# Patient Record
Sex: MALE | Race: WHITE | NOT HISPANIC OR LATINO | Employment: FULL TIME | URBAN - METROPOLITAN AREA
[De-identification: names, ages, dates, MRNs, and addresses within clinical notes are randomized per-mention and may not be internally consistent; named-entity substitution may affect disease eponyms.]

---

## 2018-03-22 ENCOUNTER — OFFICE VISIT CONVERTED (OUTPATIENT)
Dept: GASTROENTEROLOGY | Facility: CLINIC | Age: 48
End: 2018-03-22
Attending: PHYSICIAN ASSISTANT

## 2021-05-16 VITALS
DIASTOLIC BLOOD PRESSURE: 86 MMHG | SYSTOLIC BLOOD PRESSURE: 132 MMHG | BODY MASS INDEX: 32.15 KG/M2 | HEIGHT: 72 IN | HEART RATE: 86 BPM | RESPIRATION RATE: 12 BRPM | WEIGHT: 237.37 LBS

## 2023-03-30 ENCOUNTER — CLINICAL SUPPORT (OUTPATIENT)
Dept: GASTROENTEROLOGY | Facility: CLINIC | Age: 53
End: 2023-03-30
Payer: COMMERCIAL

## 2023-03-30 ENCOUNTER — PREP FOR SURGERY (OUTPATIENT)
Dept: OTHER | Facility: HOSPITAL | Age: 53
End: 2023-03-30
Payer: COMMERCIAL

## 2023-03-30 DIAGNOSIS — Z80.0 FAMILY HISTORY OF COLON CANCER: Primary | ICD-10-CM

## 2023-03-30 DIAGNOSIS — Z86.010 HISTORY OF COLON POLYPS: ICD-10-CM

## 2023-03-30 RX ORDER — PANTOPRAZOLE SODIUM 40 MG/1
1 TABLET, DELAYED RELEASE ORAL DAILY
COMMUNITY
Start: 2022-12-31

## 2023-03-30 RX ORDER — SODIUM, POTASSIUM,MAG SULFATES 17.5-3.13G
1 SOLUTION, RECONSTITUTED, ORAL ORAL EVERY 12 HOURS
Qty: 354 ML | Refills: 0 | Status: SHIPPED | OUTPATIENT
Start: 2023-03-30

## 2023-03-30 RX ORDER — HYDROCHLOROTHIAZIDE 25 MG/1
1 TABLET ORAL DAILY
COMMUNITY
Start: 2023-03-25

## 2023-03-30 NOTE — PROGRESS NOTES
Rakesh Juan  1970  53 y.o.    Reason for call: Recall  Prep prescribed: Suprep  Prep instructions reviewed with patient and sent to patient via regular mail to the home address on file  Clearance needed? No  If yes, what clearance is needed? N/A  Clearance has been requested from N/A   The patient has been scheduled for: Colonoscopy  Family history of colon cancer? Yes  If yes, indicate relative: Paternal Uncle  Family History   Problem Relation Age of Onset   • Colon cancer Paternal Uncle      Past Medical History:   Diagnosis Date   • Colon polyp      No Known Allergies  Past Surgical History:   Procedure Laterality Date   • COLONOSCOPY       Social History     Socioeconomic History   • Marital status:    Tobacco Use   • Smoking status: Never     Passive exposure: Past   • Smokeless tobacco: Never   Substance and Sexual Activity   • Alcohol use: Not Currently     Comment: occasional   • Drug use: Defer   • Sexual activity: Defer       Current Outpatient Medications:   •  hydroCHLOROthiazide (HYDRODIURIL) 25 MG tablet, Take 1 tablet by mouth Daily., Disp: , Rfl:   •  pantoprazole (PROTONIX) 40 MG EC tablet, Take 1 tablet by mouth Daily., Disp: , Rfl:

## 2023-05-15 ENCOUNTER — HOSPITAL ENCOUNTER (EMERGENCY)
Facility: HOSPITAL | Age: 53
Discharge: HOME | End: 2023-05-16
Attending: EMERGENCY MEDICINE | Admitting: EMERGENCY MEDICINE
Payer: COMMERCIAL

## 2023-05-15 VITALS
RESPIRATION RATE: 19 BRPM | OXYGEN SATURATION: 97 % | SYSTOLIC BLOOD PRESSURE: 161 MMHG | HEART RATE: 88 BPM | BODY MASS INDEX: 31.2 KG/M2 | HEIGHT: 72 IN | WEIGHT: 230.38 LBS | DIASTOLIC BLOOD PRESSURE: 95 MMHG | TEMPERATURE: 98.4 F

## 2023-05-15 DIAGNOSIS — T18.108A FOREIGN BODY IN ESOPHAGUS, INITIAL ENCOUNTER: ICD-10-CM

## 2023-05-15 DIAGNOSIS — K22.2 ESOPHAGEAL STRICTURE: Primary | ICD-10-CM

## 2023-05-15 PROCEDURE — 96374 THER/PROPH/DIAG INJ IV PUSH: CPT

## 2023-05-15 PROCEDURE — 25010000002 GLUCAGON (RDNA) PER 1 MG: Performed by: EMERGENCY MEDICINE

## 2023-05-15 PROCEDURE — 99282 EMERGENCY DEPT VISIT SF MDM: CPT

## 2023-05-15 RX ORDER — IBUPROFEN 600 MG/1
1 TABLET ORAL ONCE
Status: COMPLETED | OUTPATIENT
Start: 2023-05-15 | End: 2023-05-15

## 2023-05-15 RX ADMIN — GLUCAGON HYDROCHLORIDE 1 MG: KIT at 23:04

## 2023-05-15 NOTE — Clinical Note
[facility] EMERGENCY ROOM  [address]  [place] [address]  Phone: [phone]    Rakesh Juan was seen and treated in our emergency department on 5/15/2023.  He may return to work on 05/17/2023.         Thank you for choosing [facility].    Brigid Cuellar APRN

## 2023-05-16 ENCOUNTER — TELEPHONE (OUTPATIENT)
Dept: GASTROENTEROLOGY | Facility: CLINIC | Age: 53
End: 2023-05-16
Payer: COMMERCIAL

## 2023-05-16 PROBLEM — K22.2 ESOPHAGEAL STRICTURE: Status: ACTIVE | Noted: 2023-03-30

## 2023-05-16 NOTE — TELEPHONE ENCOUNTER
Per Dr. Seo OK to add EGD due to esophageal stricture. I have spoke with Merle in scheduling and added this to patient's colonoscopy. I attempted to call pt to notify him of addition, patient did not answer and VM mail box was full.

## 2023-05-16 NOTE — ED PROVIDER NOTES
Time: 10:07 PM EDT  Date of encounter:  5/15/2023  Independent Historian/Clinical History and Information was obtained by:   Patient  Chief Complaint   Patient presents with   • FOOD BOLUS       History is limited by: N/A    History of Present Illness:  Patient is a 53 y.o. year old male who presents to the emergency department for evaluation of food bolus stuck that occurred around 730 today.  Patient states that he was eating steak and feels like piece of meat got stuck in his throat and refuses to go down.  Patient states he does not feel nauseous and has not vomited however is having to continuously spit up his saliva.  Patient admits to chest pressure but denies chest pain or shortness of air.    The patient presents to the emergency department and states that he was eating steak when he instantly felt that it had gotten stuck in his upper chest.  He states that he has had this happen before but normally the food will pass very quickly.  He states that he has continued to have this feeling in his chest and unable to handle any of his secretions and has been having to spit in order to clear his airway.  The patient was given glucagon prior to exam and states that the food bolus passed.  He states he instantly knew when it did pass and he felt it.  He states that he is having no more chest pressure or pain in the area where he was before.  He is able to drink water without any difficulties and swallow his spit without any difficulties.  He reports that he has a scheduled upper and lower scopes scheduled with Dr. Seo.  He states that he knows he has a hiatal hernia but feels that he may have to have esophageal dilatation.  His airway is patent, his breath sounds are clear.      History provided by:  Patient   used: No        Patient Care Team  Primary Care Provider: Provider, No Known    Past Medical History:     No Known Allergies  Past Medical History:   Diagnosis Date   • Colon polyp   "    Past Surgical History:   Procedure Laterality Date   • COLONOSCOPY       Family History   Problem Relation Age of Onset   • Colon cancer Paternal Uncle        Home Medications:  Prior to Admission medications    Medication Sig Start Date End Date Taking? Authorizing Provider   hydroCHLOROthiazide (HYDRODIURIL) 25 MG tablet Take 1 tablet by mouth Daily. 3/25/23   Dheeraj Zazueta MD   pantoprazole (PROTONIX) 40 MG EC tablet Take 1 tablet by mouth Daily. 12/31/22   Dheeraj Zazueta MD   sodium-potassium-magnesium sulfates (Suprep Bowel Prep Kit) 17.5-3.13-1.6 GM/177ML solution oral solution Take 1 bottle by mouth Every 12 (Twelve) Hours. 3/30/23   Lamar Strauss APRN        Social History:   Social History     Tobacco Use   • Smoking status: Never     Passive exposure: Past   • Smokeless tobacco: Never   Substance Use Topics   • Alcohol use: Not Currently     Comment: occasional   • Drug use: Defer         Review of Systems:  Review of Systems   Constitutional: Negative for chills and fever.   HENT: Positive for trouble swallowing. Negative for congestion, ear pain and sore throat.    Eyes: Negative for pain.   Respiratory: Positive for chest tightness (CLEARED WITH RESOLUTION). Negative for cough and shortness of breath.    Cardiovascular: Negative for chest pain.   Gastrointestinal: Negative for abdominal pain, diarrhea, nausea and vomiting.   Genitourinary: Negative for flank pain and hematuria.   Musculoskeletal: Negative for back pain, joint swelling, neck pain and neck stiffness.   Skin: Negative for pallor and rash.   Neurological: Negative for seizures and headaches.   All other systems reviewed and are negative.       Physical Exam:  /95 (Patient Position: Sitting)   Pulse 88   Temp 98.4 °F (36.9 °C) (Oral)   Resp 19   Ht 182.9 cm (72\")   Wt 104 kg (230 lb 6.1 oz)   SpO2 97%   BMI 31.25 kg/m²     Physical Exam  Vitals and nursing note reviewed.   Constitutional:       General: He is " not in acute distress.     Appearance: Normal appearance. He is not ill-appearing or toxic-appearing.   HENT:      Head: Normocephalic and atraumatic.      Mouth/Throat:      Mouth: Mucous membranes are moist.      Pharynx: Oropharynx is clear.   Eyes:      General: No scleral icterus.     Conjunctiva/sclera: Conjunctivae normal.      Pupils: Pupils are equal, round, and reactive to light.   Cardiovascular:      Rate and Rhythm: Normal rate and regular rhythm.      Pulses: Normal pulses.   Pulmonary:      Effort: Pulmonary effort is normal. No respiratory distress.      Breath sounds: Normal breath sounds. No stridor. No wheezing.   Abdominal:      General: Abdomen is flat.      Palpations: Abdomen is soft.      Tenderness: There is no abdominal tenderness. There is no guarding or rebound.   Musculoskeletal:         General: Normal range of motion.      Cervical back: Normal range of motion and neck supple. No rigidity or tenderness.   Lymphadenopathy:      Cervical: No cervical adenopathy.   Skin:     General: Skin is warm and dry.      Capillary Refill: Capillary refill takes less than 2 seconds.      Findings: No rash.   Neurological:      General: No focal deficit present.      Mental Status: He is alert and oriented to person, place, and time. Mental status is at baseline.   Psychiatric:         Mood and Affect: Mood normal.         Behavior: Behavior normal.                  Procedures:  Procedures      Medical Decision Making:      Comorbidities that affect care:    Hiatal hernia    External Notes reviewed:    None      The following orders were placed and all results were independently analyzed by me:  No orders of the defined types were placed in this encounter.      Medications Given in the Emergency Department:  Medications   glucagon (GLUCAGEN) injection 1 mg (1 mg Intravenous Given 5/15/23 2304)        ED Course:    The patient was initially evaluated in the triage area where orders were placed. The  patient was later dispositioned by KERRY Harris.      The patient was advised to stay for completion of workup which includes but is not limited to communication of labs and radiological results, reassessment and plan. The patient was advised that leaving prior to disposition by a provider could result in critical findings that are not communicated to the patient.          Labs:    Lab Results (last 24 hours)     ** No results found for the last 24 hours. **           Imaging:    No Radiology Exams Resulted Within Past 24 Hours      Differential Diagnosis and Discussion:      Sore Throat: Differential diagnosis includes but is not limited to bacterial infection, viral infection, inhaled irritants, sinus drainage, thyroiditis, epiglottitis, and retropharyngeal abscess.    MDM  Number of Diagnoses or Management Options  Esophageal stricture: established and worsening  Foreign body in esophagus, initial encounter: established and worsening  Risk of Complications, Morbidity, and/or Mortality  Presenting problems: low  Diagnostic procedures: low  Management options: low    Patient Progress  Patient progress: stable       Patient Care Considerations:    CONSULT: I considered consulting Dr. Seo with GI services, however The patient's food bolus passed and the patient will follow-up outpatient with.      Consultants/Shared Management Plan:    None    Social Determinants of Health:    Patient is independent, reliable, and has access to care.       Disposition and Care Coordination:    Discharged: I considered escalation of care by admitting this patient to the hospital, however the patient has improved and is suitable and stable for discharge.    I have explained the patient´s condition, diagnoses and treatment plan based on the information available to me at this time. I have answered questions and addressed any concerns. The patient has a good  understanding of the patient´s diagnosis, condition, and treatment  plan as can be expected at this point. The vital signs have been stable. The patient´s condition is stable and appropriate for discharge from the emergency department.      The patient will pursue further outpatient evaluation with the primary care physician or other designated or consulting physician as outlined in the discharge instructions. They are agreeable to this plan of care and follow-up instructions have been explained in detail. The patient has received these instructions in written format and have expressed an understanding of the discharge instructions. The patient is aware that any significant change in condition or worsening of symptoms should prompt an immediate return to this or the closest emergency department or call to 911.  I have explained discharge medications and the need for follow up with the patient/caretakers. This was also printed in the discharge instructions. Patient was discharged with the following medications and follow up:      Medication List      No changes were made to your prescriptions during this visit.      Demarco Seo MD  [address] [place] [address] [address] [place] [address]  [phone]      AS SCHEDULED, FOR FOLLOW UP       Final diagnoses:   Esophageal stricture   Foreign body in esophagus, initial encounter        ED Disposition     ED Disposition   Discharge    Condition   Stable    Comment   --             This medical record created using voice recognition software.           Brigid Cuellar, APRN  05/16/23 0016

## 2023-05-16 NOTE — TELEPHONE ENCOUNTER
Patient request to speak with Dr Seo in regards to his ER visit [id number]. He states he is scheduled for Colonoscopy 06/14/23 and wants to have an EGD scheduled at the same time due to recurring issues with swallowing and choking on food.   Advised I will forward info to clinical staff for review.   Verified CB # [phone]    Patient called back to check status. Advised him it has been sent over to clinical staff to review and he will receive a call back .

## 2023-05-16 NOTE — DISCHARGE INSTRUCTIONS
Rest, drink plenty of fluids.  Soft diet until you follow-up with Dr. Seo.  Chew your food carefully and eat small bites until you follow-up with Dr. Seo.  You may take over-the-counter acetaminophen or Motrin as needed for aches pains of fever.  Discussed with Dr. Seo the need to possibly have an esophageal dilatation to help prevent more of these episodes in the future.  Return to the emergency department immediately for any acutely developing airway difficulty, any persistent vomiting, any recurrence of food bolus or any new or worse concerns.

## 2023-06-14 ENCOUNTER — HOSPITAL ENCOUNTER (OUTPATIENT)
Facility: HOSPITAL | Age: 53
Setting detail: HOSPITAL OUTPATIENT SURGERY
Discharge: HOME | End: 2023-06-14
Attending: INTERNAL MEDICINE | Admitting: INTERNAL MEDICINE
Payer: COMMERCIAL

## 2023-06-14 ENCOUNTER — ANESTHESIA (OUTPATIENT)
Dept: GASTROENTEROLOGY | Facility: HOSPITAL | Age: 53
End: 2023-06-14
Payer: COMMERCIAL

## 2023-06-14 ENCOUNTER — ANESTHESIA EVENT (OUTPATIENT)
Dept: GASTROENTEROLOGY | Facility: HOSPITAL | Age: 53
End: 2023-06-14
Payer: COMMERCIAL

## 2023-06-14 VITALS
OXYGEN SATURATION: 96 % | BODY MASS INDEX: 31.04 KG/M2 | DIASTOLIC BLOOD PRESSURE: 95 MMHG | TEMPERATURE: 98.8 F | RESPIRATION RATE: 20 BRPM | SYSTOLIC BLOOD PRESSURE: 136 MMHG | HEART RATE: 86 BPM | WEIGHT: 228.84 LBS

## 2023-06-14 DIAGNOSIS — K22.2 ESOPHAGEAL STRICTURE: ICD-10-CM

## 2023-06-14 DIAGNOSIS — Z80.0 FAMILY HISTORY OF COLON CANCER: ICD-10-CM

## 2023-06-14 DIAGNOSIS — Z86.010 HISTORY OF COLON POLYPS: ICD-10-CM

## 2023-06-14 PROCEDURE — 43239 EGD BIOPSY SINGLE/MULTIPLE: CPT | Performed by: INTERNAL MEDICINE

## 2023-06-14 PROCEDURE — 88305 TISSUE EXAM BY PATHOLOGIST: CPT | Performed by: INTERNAL MEDICINE

## 2023-06-14 PROCEDURE — 45385 COLONOSCOPY W/LESION REMOVAL: CPT | Performed by: INTERNAL MEDICINE

## 2023-06-14 PROCEDURE — 43249 ESOPH EGD DILATION <30 MM: CPT | Performed by: INTERNAL MEDICINE

## 2023-06-14 PROCEDURE — 25010000002 PROPOFOL 10 MG/ML EMULSION: Performed by: NURSE ANESTHETIST, CERTIFIED REGISTERED

## 2023-06-14 PROCEDURE — 45380 COLONOSCOPY AND BIOPSY: CPT | Performed by: INTERNAL MEDICINE

## 2023-06-14 PROCEDURE — C1726 CATH, BAL DIL, NON-VASCULAR: HCPCS | Performed by: INTERNAL MEDICINE

## 2023-06-14 RX ORDER — PROPOFOL 10 MG/ML
VIAL (ML) INTRAVENOUS AS NEEDED
Status: DISCONTINUED | OUTPATIENT
Start: 2023-06-14 | End: 2023-06-14 | Stop reason: SURG

## 2023-06-14 RX ORDER — SODIUM CHLORIDE, SODIUM LACTATE, POTASSIUM CHLORIDE, CALCIUM CHLORIDE 600; 310; 30; 20 MG/100ML; MG/100ML; MG/100ML; MG/100ML
INJECTION, SOLUTION INTRAVENOUS CONTINUOUS PRN
Status: DISCONTINUED | OUTPATIENT
Start: 2023-06-14 | End: 2023-06-14 | Stop reason: SURG

## 2023-06-14 RX ORDER — SODIUM CHLORIDE, SODIUM LACTATE, POTASSIUM CHLORIDE, CALCIUM CHLORIDE 600; 310; 30; 20 MG/100ML; MG/100ML; MG/100ML; MG/100ML
30 INJECTION, SOLUTION INTRAVENOUS CONTINUOUS
Status: DISCONTINUED | OUTPATIENT
Start: 2023-06-14 | End: 2023-06-14 | Stop reason: HOSPADM

## 2023-06-14 RX ORDER — LIDOCAINE HYDROCHLORIDE 20 MG/ML
INJECTION, SOLUTION EPIDURAL; INFILTRATION; INTRACAUDAL; PERINEURAL AS NEEDED
Status: DISCONTINUED | OUTPATIENT
Start: 2023-06-14 | End: 2023-06-14 | Stop reason: SURG

## 2023-06-14 RX ADMIN — PROPOFOL 333 MCG/KG/MIN: 10 INJECTION, EMULSION INTRAVENOUS at 12:11

## 2023-06-14 RX ADMIN — PROPOFOL 50 MG: 10 INJECTION, EMULSION INTRAVENOUS at 12:11

## 2023-06-14 RX ADMIN — SODIUM CHLORIDE, POTASSIUM CHLORIDE, SODIUM LACTATE AND CALCIUM CHLORIDE: 600; 310; 30; 20 INJECTION, SOLUTION INTRAVENOUS at 12:09

## 2023-06-14 RX ADMIN — LIDOCAINE HYDROCHLORIDE 100 MG: 20 INJECTION, SOLUTION EPIDURAL; INFILTRATION; INTRACAUDAL; PERINEURAL at 12:11

## 2023-06-14 NOTE — ANESTHESIA POSTPROCEDURE EVALUATION
Patient: Rakesh Juan    Procedure Summary       Date: 06/14/23 Room / Location: [facility] ENDOSCOPY 2 / [facility] ENDOSCOPY    Anesthesia Start: 1209 Anesthesia Stop: 1254    Procedures:       ESOPHAGOGASTRODUODENOSCOPY WITH BIOPSIES, 15-18MM AND 18-20MM BALLOON DILATATION OF ESOPHAGUS      COLONOSCOPY WITH POLYPECTOMY Diagnosis:       Family history of colon cancer      History of colon polyps      Esophageal stricture      (Family history of colon cancer [Z80.0])      (History of colon polyps [Z86.010])      (Esophageal stricture [K22.2])    Surgeons: Demarco Seo MD Provider: Quinn Dominguez MD    Anesthesia Type: general ASA Status: 3            Anesthesia Type: general    Vitals  Vitals Value Taken Time   /95 06/14/23 1310   Temp 37.1 °C (98.8 °F) 06/14/23 1255   Pulse 86 06/14/23 1310   Resp 20 06/14/23 1310   SpO2 96 % 06/14/23 1310           Post Anesthesia Care and Evaluation    Patient location during evaluation: bedside  Patient participation: complete - patient participated  Level of consciousness: awake  Pain management: adequate    Airway patency: patent  PONV Status: none  Cardiovascular status: acceptable and stable  Respiratory status: acceptable  Hydration status: acceptable    Comments: An Anesthesiologist personally participated in the most demanding procedures (including induction and emergence if applicable) in the anesthesia plan, monitored the course of anesthesia administration at frequent intervals and remained physically present and available for immediate diagnosis and treatment of emergencies.

## 2023-06-14 NOTE — ANESTHESIA PREPROCEDURE EVALUATION
Anesthesia Evaluation     Patient summary reviewed and Nursing notes reviewed   no history of anesthetic complications:   NPO Solid Status: > 8 hours  NPO Liquid Status: > 2 hours           Airway   Mallampati: II  TM distance: >3 FB  Neck ROM: full  No difficulty expected  Dental          Pulmonary - negative pulmonary ROS and normal exam    breath sounds clear to auscultation  Cardiovascular - negative cardio ROS and normal exam  Exercise tolerance: good (4-7 METS)    Rhythm: regular  Rate: normal        Neuro/Psych- negative ROS  GI/Hepatic/Renal/Endo - negative ROS     Musculoskeletal (-) negative ROS    Abdominal    Substance History - negative use     OB/GYN negative ob/gyn ROS         Other - negative ROS       ROS/Med Hx Other: PAT Nursing Notes unavailable.                 Anesthesia Plan    ASA 3     general       Anesthetic plan, risks, benefits, and alternatives have been provided, discussed and informed consent has been obtained with: patient and spouse/significant other.    CODE STATUS:

## 2023-06-14 NOTE — H&P
Pre Procedure History & Physical    Chief Complaint:   Gerd  Family h/o colon cancer    Subjective     HPI:   As above    Past Medical History:   Past Medical History:   Diagnosis Date    Colon polyp        Past Surgical History:  Past Surgical History:   Procedure Laterality Date    COLONOSCOPY         Family History:  Family History   Problem Relation Age of Onset    Colon cancer Paternal Uncle        Social History:   reports that he has never smoked. He has been exposed to tobacco smoke. He has never used smokeless tobacco. He reports that he does not currently use alcohol. Drug use questions deferred to the physician.    Medications:   Medications Prior to Admission   Medication Sig Dispense Refill Last Dose    hydroCHLOROthiazide (HYDRODIURIL) 25 MG tablet Take 1 tablet by mouth Daily.   6/13/2023    pantoprazole (PROTONIX) 40 MG EC tablet Take 1 tablet by mouth Daily.   6/13/2023       Allergies:  Patient has no known allergies.        Objective     Blood pressure 134/90, pulse 94, temperature 99.1 °F (37.3 °C), temperature source Temporal, resp. rate 16, weight 104 kg (228 lb 13.4 oz), SpO2 94 %.    Physical Exam   Constitutional: Pt is oriented to person, place, and time and well-developed, well-nourished, and in no distress.   Mouth/Throat: Oropharynx is clear and moist.   Neck: Normal range of motion.   Cardiovascular: Normal rate, regular rhythm and normal heart sounds.    Pulmonary/Chest: Effort normal and breath sounds normal.   Abdominal: Soft. Nontender  Skin: Skin is warm and dry.   Psychiatric: Mood, memory, affect and judgment normal.     Assessment & Plan     Diagnosis:  Gerd  Family h/o colon cancer  H/o polyps    Anticipated Surgical Procedure:  Egd  colonoscopy    The risks, benefits, and alternatives of this procedure have been discussed with the patient or the responsible party- the patient understands and agrees to proceed.

## 2023-06-15 LAB
CYTO UR: NORMAL
LAB AP CASE REPORT: NORMAL
LAB AP CLINICAL INFORMATION: NORMAL
PATH REPORT.FINAL DX SPEC: NORMAL
PATH REPORT.GROSS SPEC: NORMAL

## 2023-09-15 ENCOUNTER — OFFICE VISIT (OUTPATIENT)
Dept: GASTROENTEROLOGY | Facility: CLINIC | Age: 53
End: 2023-09-15
Payer: COMMERCIAL

## 2023-09-15 VITALS
SYSTOLIC BLOOD PRESSURE: 158 MMHG | HEIGHT: 72 IN | OXYGEN SATURATION: 96 % | WEIGHT: 231.9 LBS | DIASTOLIC BLOOD PRESSURE: 92 MMHG | HEART RATE: 72 BPM | BODY MASS INDEX: 31.41 KG/M2

## 2023-09-15 DIAGNOSIS — K22.2 ESOPHAGEAL STRICTURE: ICD-10-CM

## 2023-09-15 DIAGNOSIS — Z80.0 FAMILY HISTORY OF COLON CANCER: Primary | ICD-10-CM

## 2023-09-15 DIAGNOSIS — Z86.010 HISTORY OF COLON POLYPS: ICD-10-CM

## 2023-09-15 RX ORDER — SILDENAFIL 50 MG/1
TABLET, FILM COATED ORAL
COMMUNITY

## 2023-09-15 NOTE — PROGRESS NOTES
Chief Complaint   Follow up    History of Present Illness       Rakesh Juan is a 53 y.o. male who presents to [facility] GASTROENTEROLOGY for follow-up after recent EGD and colonoscopy.  We reviewed endoscopy and pathology at this time.  Patient reports improvement in his dysphagia following esophageal dilation.  Patient continues Protonix 40 mg daily with good control of his reflux.  Bowel movements are slightly constipated at times that he does use Metamucil at least 3 times a week to help improve his stool.  Patient denies fever, nausea, vomiting, weight loss, night sweats, melena, hematochezia, hematemesis.    Endoscopy: Review of the patient's most recent EGD and colonoscopy performed by Dr. Seo on 06.14.2023 for small polyps removed throughout the colon recall 3 years.  Pathology tubular adenoma and sessile serrated lesion.  EGD with medium size hiatal hernia and esophageal stricture dilated to 20 mm.  Biopsies normal.      Results       Result Review :   The following data was reviewed by: KERRY Welch on 09/15/2023:          Iron Profile No results found for: IRON, TIBC, LABIRON, TRANSFERRIN  Ferritin No results found for: FERRITIN            Past Medical History       Past Medical History:   Diagnosis Date    Colon polyp     GERD (gastroesophageal reflux disease)     Hypertension        Past Surgical History:   Procedure Laterality Date    COLONOSCOPY      COLONOSCOPY N/A 6/14/2023    Procedure: COLONOSCOPY WITH POLYPECTOMY;  Surgeon: Demarco Seo MD;  Location: [facility] ENDOSCOPY;  Service: Gastroenterology;  Laterality: N/A;  COLON POLYPS, DIVERTICULOSIS    ENDOSCOPY N/A 6/14/2023    Procedure: ESOPHAGOGASTRODUODENOSCOPY WITH BIOPSIES, 15-18MM AND 18-20MM BALLOON DILATATION OF ESOPHAGUS;  Surgeon: Demarco Seo MD;  Location: [facility] ENDOSCOPY;  Service: Gastroenterology;  Laterality: N/A;  HIATAL HERNIA, STRICTURE AT GE JUNCTION         Current Outpatient  "Medications:     hydroCHLOROthiazide (HYDRODIURIL) 25 MG tablet, Take 1 tablet by mouth Daily., Disp: , Rfl:     pantoprazole (PROTONIX) 40 MG EC tablet, Take 1 tablet by mouth Daily., Disp: , Rfl:     sildenafil (VIAGRA) 50 MG tablet, sildenafil 50 mg tablet, Disp: , Rfl:      No Known Allergies    Family History   Problem Relation Age of Onset    Colon cancer Paternal Uncle         Social History     Social History Narrative    Not on file       Objective     Vital Signs:   /92 (BP Location: Right arm, Patient Position: Sitting, Cuff Size: Adult)   Pulse 72   Ht 182.9 cm (72\")   Wt 105 kg (231 lb 14.4 oz)   SpO2 96%   BMI 31.45 kg/m²       Physical Exam  Constitutional:       General: He is not in acute distress.     Appearance: Normal appearance. He is well-developed and normal weight.   Eyes:      Conjunctiva/sclera: Conjunctivae normal.      Pupils: Pupils are equal, round, and reactive to light.      Visual Fields: Right eye visual fields normal and left eye visual fields normal.   Cardiovascular:      Rate and Rhythm: Normal rate and regular rhythm.      Heart sounds: Normal heart sounds.   Pulmonary:      Effort: Pulmonary effort is normal. No retractions.      Breath sounds: Normal breath sounds and air entry.      Comments: Inspection of chest: normal appearance  Abdominal:      General: Bowel sounds are normal.      Palpations: Abdomen is soft.      Tenderness: There is no abdominal tenderness.      Comments: No appreciable hepatosplenomegaly   Musculoskeletal:      Cervical back: Neck supple.      Right lower leg: No edema.      Left lower leg: No edema.   Lymphadenopathy:      Cervical: No cervical adenopathy.   Skin:     Findings: No lesion.      Comments: Turgor normal   Neurological:      Mental Status: He is alert and oriented to person, place, and time.   Psychiatric:         Mood and Affect: Mood and affect normal.         Assessment & Plan          Assessment and Plan    Diagnoses and " all orders for this visit:    1. Family history of colon cancer (Primary)    2. History of colon polyps    3. Esophageal stricture      53-year-old male presenting the office today for follow-up after recent EGD and colonoscopy.  We have reviewed endoscopy and pathology at this time.  Patient be due for repeat colonoscopy in 2026 and I would recommend repeat EGD at that time due to esophageal stricture.  Patient will continue Protonix as prescribed.  Patient will follow-up as needed.  Patient agreeable to this plan will call with any questions or concerns.          Follow Up       Follow Up   Return if symptoms worsen or fail to improve.  Patient was given instructions and counseling regarding his condition or for health maintenance advice. Please see specific information pulled into the AVS if appropriate.

## 2023-09-15 NOTE — PATIENT INSTRUCTIONS
Food Choices for Gastroesophageal Reflux Disease, Adult  When you have gastroesophageal reflux disease (GERD), the foods you eat and your eating habits are very important. Choosing the right foods can help ease your discomfort. Think about working with a food expert (dietitian) to help you make good choices.  What are tips for following this plan?  Reading food labels  Look for foods that are low in saturated fat. Foods that may help with your symptoms include:  Foods that have less than 5% of daily value (DV) of fat.  Foods that have 0 grams of trans fat.  Cooking  Do not blanco your food.  Cook your food by baking, steaming, grilling, or broiling. These are all methods that do not need a lot of fat for cooking.  To add flavor, try to use herbs that are low in spice and acidity.  Meal planning    Choose healthy foods that are low in fat, such as:  Fruits and vegetables.  Whole grains.  Low-fat dairy products.  Lean meats, fish, and poultry.  Eat small meals often instead of eating 3 large meals each day. Eat your meals slowly in a place where you are relaxed. Avoid bending over or lying down until 2-3 hours after eating.  Limit high-fat foods such as fatty meats or fried foods.  Limit your intake of fatty foods, such as oils, butter, and shortening.  Avoid the following as told by your doctor:  Foods that cause symptoms. These may be different for different people. Keep a food diary to keep track of foods that cause symptoms.  Alcohol.  Drinking a lot of liquid with meals.  Eating meals during the 2-3 hours before bed.  Lifestyle  Stay at a healthy weight. Ask your doctor what weight is healthy for you. If you need to lose weight, work with your doctor to do so safely.  Exercise for at least 30 minutes on 5 or more days each week, or as told by your doctor.  Wear loose-fitting clothes.  Do not smoke or use any products that contain nicotine or tobacco. If you need help quitting, ask your doctor.  Sleep with the head  of your bed higher than your feet. Use a wedge under the mattress or blocks under the bed frame to raise the head of the bed.  Chew sugar-free gum after meals.  What foods should eat?    Eat a healthy, well-balanced diet of fruits, vegetables, whole grains, low-fat dairy products, lean meats, fish, and poultry. Each person is different.  Foods that may cause symptoms in one person may not cause any symptoms in another person. Work with your doctor to find foods that are safe for you.  The items listed above may not be a complete list of what you can eat and drink. Contact a food expert for more options.  What foods should I avoid?  Limiting some of these foods may help in managing the symptoms of GERD. Everyone is different. Talk with a food expert or your doctor to help you find the exact foods to avoid, if any.  Fruits  Any fruits prepared with added fat. Any fruits that cause symptoms. For some people, this may include citrus fruits, such as oranges, grapefruit, pineapple, and olga lidia.  Vegetables  Deep-fried vegetables. French fries. Any vegetables prepared with added fat. Any vegetables that cause symptoms. For some people, this may include tomatoes and tomato products, chili peppers, onions and garlic, and horseradish.  Grains  Pastries or quick breads with added fat.  Meats and other proteins  High-fat meats, such as fatty beef or pork, hot dogs, ribs, ham, sausage, salami, and daniel. Fried meat or protein, including fried fish and fried chicken. Nuts and nut butters, in large amounts.  Dairy  Whole milk and chocolate milk. Sour cream. Cream. Ice cream. Cream cheese. Milkshakes.  Fats and oils  Butter. Margarine. Shortening. Ghee.  Beverages  Coffee and tea, with or without caffeine. Carbonated beverages. Sodas. Energy drinks. Fruit juice made with acidic fruits, such as orange or grapefruit. Tomato juice. Alcoholic drinks.  Sweets and desserts  Chocolate and cocoa. Donuts.  Seasonings and condiments  Pepper.  Peppermint and spearmint. Added salt. Any condiments, herbs, or seasonings that cause symptoms. For some people, this may include hinojosa, hot sauce, or vinegar-based salad dressings.  The items listed above may not be a complete list of what you should not eat and drink. Contact a food expert for more options.  Questions to ask your doctor  Diet and lifestyle changes are often the first steps that are taken to manage symptoms of GERD. If diet and lifestyle changes do not help, talk with your doctor about taking medicines.  Where to find more information  [employer]: aboutgerd.org  Summary  When you have GERD, food and lifestyle choices are very important in easing your symptoms.  Eat small meals often instead of 3 large meals a day. Eat your meals slowly and in a place where you are relaxed.  Avoid bending over or lying down until 2-3 hours after eating.  Limit high-fat foods such as fatty meats or fried foods.  This information is not intended to replace advice given to you by your health care provider. Make sure you discuss any questions you have with your health care provider.  Document Revised: 06/28/2021 Document Reviewed: 06/28/2021  Elsevier Patient Education © 2023 [employer]  Gastroesophageal Reflux Disease, Adult  Gastroesophageal reflux (ABIODUN) happens when acid from the stomach flows up into the tube that connects the mouth and the stomach (esophagus). Normally, food travels down the esophagus and stays in the stomach to be digested. However, when a person has ABIODUN, food and stomach acid sometimes move back up into the esophagus. If this becomes a more serious problem, the person may be diagnosed with a disease called gastroesophageal reflux disease (GERD). GERD occurs when the reflux:  Happens often.  Causes frequent or severe symptoms.  Causes problems such as damage to the esophagus.  When stomach acid comes in contact with the esophagus, the acid may cause  inflammation in the esophagus. Over time, GERD may create small holes (ulcers) in the lining of the esophagus.  What are the causes?  This condition is caused by a problem with the muscle between the esophagus and the stomach (lower esophageal sphincter, or LES). Normally, the LES muscle closes after food passes through the esophagus to the stomach. When the LES is weakened or abnormal, it does not close properly, and that allows food and stomach acid to go back up into the esophagus.  The LES can be weakened by certain dietary substances, medicines, and medical conditions, including:  Tobacco use.  Pregnancy.  Having a hiatal hernia.  Alcohol use.  Certain foods and beverages, such as coffee, chocolate, onions, and peppermint.  What increases the risk?  You are more likely to develop this condition if you:  Have an increased body weight.  Have a connective tissue disorder.  Take NSAIDs, such as ibuprofen.  What are the signs or symptoms?  Symptoms of this condition include:  Heartburn.  Difficult or painful swallowing and the feeling of having a lump in the throat.  A bitter taste in the mouth.  Bad breath and having a large amount of saliva.  Having an upset or bloated stomach and belching.  Chest pain. Different conditions can cause chest pain. Make sure you see your health care provider if you experience chest pain.  Shortness of breath or wheezing.  Ongoing (chronic) cough or a nighttime cough.  Wearing away of tooth enamel.  Weight loss.  How is this diagnosed?  This condition may be diagnosed based on a medical history and a physical exam. To determine if you have mild or severe GERD, your health care provider may also monitor how you respond to treatment. You may also have tests, including:  A test to examine your stomach and esophagus with a small camera (endoscopy).  A test that measures the acidity level in your esophagus.  A test that measures how much pressure is on your esophagus.  A barium swallow or  modified barium swallow test to show the shape, size, and functioning of your esophagus.  How is this treated?  Treatment for this condition may vary depending on how severe your symptoms are. Your health care provider may recommend:  Changes to your diet.  Medicine.  Surgery.  The goal of treatment is to help relieve your symptoms and to prevent complications.  Follow these instructions at home:  Eating and drinking    Follow a diet as recommended by your health care provider. This may involve avoiding foods and drinks such as:  Coffee and tea, with or without caffeine.  Drinks that contain alcohol.  Energy drinks and sports drinks.  Carbonated drinks or sodas.  Chocolate and cocoa.  Peppermint and mint flavorings.  Garlic and onions.  Horseradish.  Spicy and acidic foods, including peppers, chili powder, hinojosa powder, vinegar, hot sauces, and barbecue sauce.  Citrus fruit juices and citrus fruits, such as oranges, olga lidia, and limes.  Tomato-based foods, such as red sauce, chili, salsa, and pizza with red sauce.  Fried and fatty foods, such as donuts, french fries, potato chips, and high-fat dressings.  High-fat meats, such as hot dogs and fatty cuts of red and white meats, such as rib eye steak, sausage, ham, and daniel.  High-fat dairy items, such as whole milk, butter, and cream cheese.  Eat small, frequent meals instead of large meals.  Avoid drinking large amounts of liquid with your meals.  Avoid eating meals during the 2-3 hours before bedtime.  Avoid lying down right after you eat.  Do not exercise right after you eat.  Lifestyle    Do not use any products that contain nicotine or tobacco. These products include cigarettes, chewing tobacco, and vaping devices, such as e-cigarettes. If you need help quitting, ask your health care provider.  Try to reduce your stress by using methods such as yoga or meditation. If you need help reducing stress, ask your health care provider.  If you are overweight, reduce your  weight to an amount that is healthy for you. Ask your health care provider for guidance about a safe weight loss goal.  General instructions  Pay attention to any changes in your symptoms.  Take over-the-counter and prescription medicines only as told by your health care provider. Do not take aspirin, ibuprofen, or other NSAIDs unless your health care provider told you to take these medicines.  Wear loose-fitting clothing. Do not wear anything tight around your waist that causes pressure on your abdomen.  Raise (elevate) the head of your bed about 6 inches (15 cm). You can use a wedge to do this.  Avoid bending over if this makes your symptoms worse.  Keep all follow-up visits. This is important.  Contact a health care provider if:  You have:  New symptoms.  Unexplained weight loss.  Difficulty swallowing or it hurts to swallow.  Wheezing or a persistent cough.  A hoarse voice.  Your symptoms do not improve with treatment.  Get help right away if:  You have sudden pain in your arms, neck, jaw, teeth, or back.  You suddenly feel sweaty, dizzy, or light-headed.  You have chest pain or shortness of breath.  You vomit and the vomit is green, yellow, or black, or it looks like blood or coffee grounds.  You faint.  You have stool that is red, bloody, or black.  You cannot swallow, drink, or eat.  These symptoms may represent a serious problem that is an emergency. Do not wait to see if the symptoms will go away. Get medical help right away. Call your local emergency services (911 in the [place] Do not drive yourself to the hospital.  Summary  Gastroesophageal reflux happens when acid from the stomach flows up into the esophagus. GERD is a disease in which the reflux happens often, causes frequent or severe symptoms, or causes problems such as damage to the esophagus.  Treatment for this condition may vary depending on how severe your symptoms are. Your health care provider may recommend diet and lifestyle changes,  medicine, or surgery.  Contact a health care provider if you have new or worsening symptoms.  Take over-the-counter and prescription medicines only as told by your health care provider. Do not take aspirin, ibuprofen, or other NSAIDs unless your health care provider told you to do so.  Keep all follow-up visits as told by your health care provider. This is important.  This information is not intended to replace advice given to you by your health care provider. Make sure you discuss any questions you have with your health care provider.  Document Revised: 06/28/2021 Document Reviewed: 06/28/2021  [employer]vier Patient Education © 2023 [employer]

## (undated) DEVICE — LINER SURG CANSTR SXN S/RIGD 1500CC

## (undated) DEVICE — ESOPHAGEAL BALLOON DILATATION CATHETER: Brand: CRE FIXED WIRE

## (undated) DEVICE — BLCK/BITE BLOX WO/DENTL/RIM W/STRAP 54F

## (undated) DEVICE — CONN JET HYDRA H20 AUXILIARY DISP

## (undated) DEVICE — SNAR POLYP CAPTIFLEX XS/OVL 11X2.4MM 240CM 1P/U

## (undated) DEVICE — THE SINGLE USE ETRAP – POLYP TRAP IS USED FOR SUCTION RETRIEVAL OF ENDOSCOPICALLY REMOVED POLYPS.: Brand: ETRAP

## (undated) DEVICE — Device

## (undated) DEVICE — DEV INFL CRE STERIFLATE 60CC DISP

## (undated) DEVICE — Device: Brand: DEFENDO AIR/WATER/SUCTION AND BIOPSY VALVE

## (undated) DEVICE — SOLIDIFIER LIQLOC PLS 1500CC BT

## (undated) DEVICE — SOL IRRG H2O PL/BG 1000ML STRL

## (undated) DEVICE — SINGLE-USE BIOPSY FORCEPS: Brand: RADIAL JAW 4